# Patient Record
Sex: FEMALE | Race: WHITE
[De-identification: names, ages, dates, MRNs, and addresses within clinical notes are randomized per-mention and may not be internally consistent; named-entity substitution may affect disease eponyms.]

---

## 2017-08-29 ENCOUNTER — HOSPITAL ENCOUNTER (EMERGENCY)
Dept: HOSPITAL 25 - UCEAST | Age: 72
Discharge: HOME | End: 2017-08-29
Payer: MEDICARE

## 2017-08-29 VITALS — DIASTOLIC BLOOD PRESSURE: 71 MMHG | SYSTOLIC BLOOD PRESSURE: 150 MMHG

## 2017-08-29 DIAGNOSIS — Z88.3: ICD-10-CM

## 2017-08-29 DIAGNOSIS — J32.9: Primary | ICD-10-CM

## 2017-08-29 PROCEDURE — 99212 OFFICE O/P EST SF 10 MIN: CPT

## 2017-08-29 PROCEDURE — G0463 HOSPITAL OUTPT CLINIC VISIT: HCPCS

## 2017-08-29 NOTE — UC
Throat Pain/Nasal Leonard HPI





- HPI Summary


HPI Summary: 





NINE DAYS OF NASAL CONGESTION FACIAL PRESSURE ON RIGHT SIDE OF FACE. EAR 

PRESSURE RIGHT SIDE. FEVER AND CHILLS FOUR DAYS AGO. 





- History of Current Complaint


Chief Complaint: UCRespiratory


Stated Complaint: URI


Time Seen by Provider: 08/29/17 12:09


Hx Obtained From: Patient


Onset/Duration: Sudden Onset, Lasting Weeks, Still Present


Severity: Moderate


Cough: Nonproductive


Associated Signs & Symptoms: Positive: Sinus Discomfort, Nasal Discharge, Fever





- Epiglottits Risk Factors


Epiglottis Risk Factors: Negative





- Allergies/Home Medications


Allergies/Adverse Reactions: 


 Allergies











Allergy/AdvReac Type Severity Reaction Status Date / Time


 


Bacitracin [From Neosporin] Allergy  Rash Verified 08/29/17 11:34


 


Neomycin [From Neosporin] Allergy  Rash Verified 08/29/17 11:34


 


Polymyxin B [From Neosporin] Allergy  Rash Verified 08/29/17 11:34











Home Medications: 


 Home Medications





Irbesartan 75 mg PO 08/29/17 [History]


LoraTADine TAB(NF) [Claritin 10 MG TAB(NF)] 10 mg PO DAILY 08/29/17 [History 

Confirmed 08/29/17]











PMH/Surg Hx/FS Hx/Imm Hx


Previously Healthy: Yes





- Surgical History


Surgical History: Yes


Surgery Procedure, Year, and Place: BX BOTH BREASTS





- Family History


Known Family History: 


   Negative: Respiratory Disease





- Social History


Occupation: Retired


Lives: With Family


Alcohol Use: Daily


Substance Use Type: None


Smoking Status (MU): Never Smoked Tobacco





Review of Systems


Constitutional: Fever, Chills


Skin: Negative


Eyes: Negative


ENT: Ear Ache, Nasal Discharge, Sinus Congestion


Respiratory: Cough


Cardiovascular: Negative


Gastrointestinal: Negative


Genitourinary: Negative


Motor: Negative


Neurovascular: Negative


Musculoskeletal: Negative


Neurological: Negative


Psychological: Negative


All Other Systems Reviewed And Are Negative: Yes





Physical Exam


Triage Information Reviewed: Yes


Appearance: No Pain Distress, Well-Nourished, Ill-Appearing - MILDLY, Thin


Vital Signs: 


 Initial Vital Signs











Temp  98.8 F   08/29/17 11:29


 


Pulse  80   08/29/17 11:29


 


Resp  18   08/29/17 11:29


 


BP  150/71   08/29/17 11:29


 


Pulse Ox  99   08/29/17 11:29











Vital Signs Reviewed: Yes


Eye Exam: Normal


ENT: Positive: Pharynx normal, Nasal congestion, TM bulging, TM dull


Dental Exam: Normal


Neck exam: Normal


Neck: Positive: Supple, Nontender, No Lymphadenopathy


Respiratory Exam: Other - COUGH


Respiratory: Positive: Chest non-tender, Lungs clear, Normal breath sounds, No 

respiratory distress, No accessory muscle use


Cardiovascular Exam: Normal


Cardiovascular: Positive: RRR, No Murmur, Pulses Normal


Abdominal Exam: Normal


Musculoskeletal Exam: Normal


Musculoskeletal: Positive: Strength Intact, ROM Intact


Neurological Exam: Normal


Psychological Exam: Normal


Skin Exam: Normal





Throat Pain/Nasal Course/Dx





- Course


Course Of Treatment: CLAUDIA APONTEADELITA BP YESTERDAY /80. TODAY IT 

/71, WILL RECHECK LATER AND INFORM PRIMARY CARE PROVIDER THIS WEEK





- Differential Dx/Diagnosis


Differential Diagnosis/HQI/PQRI: Pharyngitis, Sinusitis, Tonsillitis, URI


Provider Diagnoses: SINUSITIS





Discharge





- Discharge Plan


Condition: Stable


Disposition: HOME


Prescriptions: 


Amoxicillin/Clavulanate TAB* [Augmentin *] 875 mg PO BID #20 tab


Patient Education Materials:  Sinusitis (ED)


Referrals: 


Dorothy Richards MD [Primary Care Provider] -

## 2018-07-03 ENCOUNTER — HOSPITAL ENCOUNTER (EMERGENCY)
Dept: HOSPITAL 25 - ED | Age: 73
Discharge: HOME | End: 2018-07-03
Payer: MEDICARE

## 2018-07-03 VITALS — DIASTOLIC BLOOD PRESSURE: 66 MMHG | SYSTOLIC BLOOD PRESSURE: 115 MMHG

## 2018-07-03 DIAGNOSIS — I10: ICD-10-CM

## 2018-07-03 DIAGNOSIS — Z79.899: ICD-10-CM

## 2018-07-03 DIAGNOSIS — I49.1: ICD-10-CM

## 2018-07-03 DIAGNOSIS — Z88.3: ICD-10-CM

## 2018-07-03 DIAGNOSIS — R00.2: Primary | ICD-10-CM

## 2018-07-03 LAB
BASOPHILS # BLD AUTO: 0 10^3/UL (ref 0–0.2)
EOSINOPHIL # BLD AUTO: 0.2 10^3/UL (ref 0–0.6)
HCT VFR BLD AUTO: 40 % (ref 35–47)
HGB BLD-MCNC: 13.6 G/DL (ref 12–16)
INR PPP/BLD: 0.95 (ref 0.77–1.02)
LYMPHOCYTES # BLD AUTO: 3.4 10^3/UL (ref 1–4.8)
MCH RBC QN AUTO: 33 PG (ref 27–31)
MCHC RBC AUTO-ENTMCNC: 34 G/DL (ref 31–36)
MCV RBC AUTO: 96 FL (ref 80–97)
MONOCYTES # BLD AUTO: 0.6 10^3/UL (ref 0–0.8)
NEUTROPHILS # BLD AUTO: 2.3 10^3/UL (ref 1.5–7.7)
NRBC # BLD AUTO: 0 10^3/UL
NRBC BLD QL AUTO: 0.1
PLATELET # BLD AUTO: 257 10^3/UL (ref 150–450)
RBC # BLD AUTO: 4.15 10^6/UL (ref 4–5.4)
WBC # BLD AUTO: 6.5 10^3/UL (ref 3.5–10.8)

## 2018-07-03 PROCEDURE — 80053 COMPREHEN METABOLIC PANEL: CPT

## 2018-07-03 PROCEDURE — 83735 ASSAY OF MAGNESIUM: CPT

## 2018-07-03 PROCEDURE — 93005 ELECTROCARDIOGRAM TRACING: CPT

## 2018-07-03 PROCEDURE — 36415 COLL VENOUS BLD VENIPUNCTURE: CPT

## 2018-07-03 PROCEDURE — 96374 THER/PROPH/DIAG INJ IV PUSH: CPT

## 2018-07-03 PROCEDURE — 85025 COMPLETE CBC W/AUTO DIFF WBC: CPT

## 2018-07-03 PROCEDURE — 84484 ASSAY OF TROPONIN QUANT: CPT

## 2018-07-03 PROCEDURE — 71045 X-RAY EXAM CHEST 1 VIEW: CPT

## 2018-07-03 PROCEDURE — 83880 ASSAY OF NATRIURETIC PEPTIDE: CPT

## 2018-07-03 PROCEDURE — 84443 ASSAY THYROID STIM HORMONE: CPT

## 2018-07-03 PROCEDURE — 85610 PROTHROMBIN TIME: CPT

## 2018-07-03 PROCEDURE — 99282 EMERGENCY DEPT VISIT SF MDM: CPT

## 2018-07-03 PROCEDURE — 85730 THROMBOPLASTIN TIME PARTIAL: CPT

## 2018-07-03 NOTE — ED
Mariano BARBOUR Jade, scribed for Yasmin Lemons MD on 07/03/18 at 0158 .





Palpitations / Dysrhythmia





- HPI Summary


HPI Summary: 


Pt is a 72 y/o female who presents to the ED c/o palpitations. She states 

starting at 23:15 last night, she began to have a fast and pounding heartbeat 

while she was sitting. The episode lasted for approximately 2 hours, and was 

constant throughout the episode. Pt denies any CP, sweating, or dizziness. She 

has a PMHx of intermittent PVCs, but states this felt different. At home, her 

blood pressure was 157/92. Pt states she drinks minimal caffeine and has 1 beer 

daily. She currently takes Irbesartan.





- History of Current Complaint


Chief Complaint: EDDysrhythmPalp


Time Seen by Provider: 07/03/18 01:43


Hx Obtained From: Patient


Onset/Duration: Sudden Onset, Lasting Hours - 23:15, Resolved


Timing: Intermittent Episodes Lasting: - 2 hours


Severity Currently: None


Character: Fast, Pounding


Aggravating: Nothing


Alleviating: Nothing


Associated Signs & Symptoms: Negative





- Allergy/Home Medications


Allergies/Adverse Reactions: 


 Allergies











Allergy/AdvReac Type Severity Reaction Status Date / Time


 


bacitracin AdvReac  Rash Verified 07/03/18 01:34





[From Neosporin     





([id number])]     


 


neomycin AdvReac  Rash Verified 07/03/18 01:34





[From Neosporin     





([id number])]     


 


polymyxin B AdvReac  Rash Verified 07/03/18 01:34





[From Neosporin     





([id number])]     














PMH/Surg Hx/FS Hx/Imm Hx


Cardiovascular History: Reports: Hx Hypertension, Other Cardiovascular Problems/

Disorders - Intermittent PVCs


Musculoskeletal History: 


   Denies: Hx Osteoporosis





- Cancer History


Hx Chemotherapy: No


Hx Radiation Therapy: No





- Surgical History


Surgery Procedure, Year, and Place: BX BOTH BREASTS


Infectious Disease History: No


Infectious Disease History: 


   Denies: Traveled Outside the [place] in Last 30 Days





- Family History


Known Family History: 


   Negative: Respiratory Disease





- Social History


Alcohol Use: Daily


Alcohol Amount: 1 beer


Substance Use Type: Reports: None


Smoking Status (MU): Never Smoked Tobacco





Review of Systems


Negative: Skin Diaphoresis


Positive: Palpitations.  Negative: Chest Pain


Neurological: Negative - Dizziness


All Other Systems Reviewed And Are Negative: Yes





Physical Exam





- Summary


Physical Exam Summary: 


VITAL SIGNS: Reviewed.


GENERAL: Patient is a well-developed and nourished FEMALE who is lying 

comfortable in the stretcher. Patient is not in any acute respiratory distress.


HEAD AND FACE: No signs of trauma. No ecchymosis, hematomas or skull 

depressions. No sinus tenderness.


EYES: PERRLA, EOMI x 2, No injected conjunctiva, no nystagmus.


EARS: Hearing grossly intact. Ear canals and tympanic membranes are within 

normal limits.


MOUTH: Oropharynx within normal limits.


NECK: Supple, trachea is midline, no adenopathy, no JVD, no carotid bruit, no c-

spine tenderness, neck with full ROM.


CHEST: Symmetric, no tenderness at palpation


LUNGS: Clear to auscultation bilaterally. No wheezing or crackles.


CVS: Regular rate and rhythm, S1 and S2 present, no murmurs or gallops 

appreciated.


ABDOMEN: Soft, non-tender. No signs of distention. No rebound no guarding, and 

no masses palpated. Bowel sounds are normal.


EXTREMITIES: FROM in all major joints, no edema, no cyanosis or clubbing.


NEURO: Alert and oriented x 3. No acute neurological deficits. Speech is normal 

and follows commands.


SKIN: Dry and warm





Triage Information Reviewed: Yes


Vital Signs On Initial Exam: 


 Initial Vitals











Temp Pulse Resp BP Pulse Ox


 


 97.1 F   75   15   198/138   99 


 


 07/03/18 01:30  07/03/18 01:30  07/03/18 01:30  07/03/18 01:30  07/03/18 01:30











Vital Signs Reviewed: Yes





Diagnostics





- Vital Signs


 Vital Signs











  Temp Pulse Resp BP Pulse Ox


 


 07/03/18 01:30  97.1 F  75  15  198/138  99














- Laboratory


Result Diagrams: 


 07/03/18 02:10





 07/03/18 02:10


Lab Statement: Any lab studies that have been ordered have been reviewed, and 

results considered in the medical decision making process.





- EKG


  ** 01:42


Cardiac Rate: NL - 65 bpm


EKG Interpretation: PAC. Normal axis. Normal intervals.





Course/Dx





- Course


Course Of Treatment: Pt is a 72 y/o female c/o palpitations starting at 23:15 

last night. Pt denies any CP, sweating, or dizziness. She has a PMHx of 

intermittent PVCs, but states this felt different. A physical exam was normal. 

An EKG revealed normal rate of 65 bpm, PAC, normal axis, normal intervals. Pt 

has been having normal rate and rhythm in ER, and has been asymptomatic. Pt had 

no dizziness even at home when HR was fast. She is reluctant to be admitted, 

and would rather go home. Final dx is palpitations. Pt is discharged and to 

follow up with her PCP tomorrow, and was advised to get a Holter monitor. She 

is agreeable with this plan.





- Diagnoses


Provider Diagnoses: 


 Palpitations








Discharge





- Sign-Out/Discharge


Documenting (check all that apply): Discharge/Admit/Transfer - Discharge





- Discharge Plan


Condition: Stable


Disposition: HOME


Patient Education Materials:  Heart Palpitations (ED)


Referrals: 


Dorothy Richards MD [Primary Care Provider] - 1 Day


Additional Instructions: 


RETURN TO THE EMERGENCY DEPARTMENT FOR CHANGING OR WORSENING SYMPTOMS. Please 

get a Holter monitor.





The documentation as recorded by the Mariano miller Jade accurately reflects the 

service I personally performed and the decisions made by me, Yasmin Lemons MD.

## 2018-07-03 NOTE — RAD
INDICATION: Chest pain. Palpitations.



COMPARISON: June 23, 2012

 

TECHNIQUE: An AP portable view obtained at 0200 hours is submitted.



FINDINGS: 



Bones/Soft Tissues: There are no acute bony findings.



Cardiomediastinal: The cardiomediastinal silhouette is normal. 



Lungs: There are no infiltrates.



Pleura: There are no pleural effusions. 



Other: None



IMPRESSION:  NO ACTIVE DISEASE.